# Patient Record
(demographics unavailable — no encounter records)

---

## 2025-04-01 NOTE — PHYSICAL EXAM
[Chaperone Present] : A chaperone was present in the examining room during all aspects of the physical examination [Appropriately responsive] : appropriately responsive [Alert] : alert [No Acute Distress] : no acute distress [No Lymphadenopathy] : no lymphadenopathy [Regular Rate Rhythm] : regular rate rhythm [No Murmurs] : no murmurs [Clear to Auscultation B/L] : clear to auscultation bilaterally [Soft] : soft [Non-tender] : non-tender [Non-distended] : non-distended [No HSM] : No HSM [No Lesions] : no lesions [No Mass] : no mass [Oriented x3] : oriented x3 [Examination Of The Breasts] : a normal appearance [No Masses] : no breast masses were palpable [Discharge] : discharge [Scant] : scant [Clear] : clear [Thin] : thin [Normal] : normal [Normal Position] : in a normal position [FreeTextEntry2] : cassandra henderson [Foul Smelling] : not foul smelling

## 2025-04-01 NOTE — HISTORY OF PRESENT ILLNESS
[Frequency: Q ___ days] : menstrual periods occur approximately every [unfilled] days [Menarche Age: ____] : age at menarche was [unfilled] [Currently Active] : currently active [Men] : men [Vaginal] : vaginal [No] : No [FreeTextEntry1] : 03/28/2025

## 2025-04-01 NOTE — REVIEW OF SYSTEMS
EMG/NCS done. See Procedure Note for results. Discussed EMGNCS of the R UE and R LE showing generalized predominantly sensory polyneuropathy    Just recently had blood tests. Results pending. 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  Patient declined for now  2) Advise optimizing medical management of diabetes  3) Follow up blood test results    FU in 1 month      Lester Dobbins MD [Negative] : Heme/Lymph

## 2025-04-16 NOTE — PROCEDURE
[Endometrial Biopsy] : Endometrial biopsy [Time out performed] : Pre-procedure time out performed.  Patient's name, date of birth and procedure confirmed. [Consent Obtained] : Consent obtained [Thickened Endometrium] : thickened endometrium [Risks] : risks [Benefits] : benefits [Alternatives] : alternatives [Patient] : patient [Infection] : infection [Bleeding] : bleeding [Allergic Reaction] : allergic reaction [Uterine Perforation] : uterine perforation [Pain] : pain [No Premedication] : No premedication [None] : none [Tenaculum] : Tenaculum [Scant] : scant [Mid Position] : mid position [Specimen Collected] : collected [Sent to Pathology] : placed in buffered formalin and sent for pathology [Tolerated Well] : Patient tolerated the procedure well [No Complications] : No complications [LMPDate] : 04/15/2025 [de-identified] : we could not pass theough internal os

## 2025-04-16 NOTE — PHYSICAL EXAM
[MA] : MA [Appropriately responsive] : appropriately responsive [Alert] : alert [No Acute Distress] : no acute distress [No Lymphadenopathy] : no lymphadenopathy [Regular Rate Rhythm] : regular rate rhythm [No Murmurs] : no murmurs [Clear to Auscultation B/L] : clear to auscultation bilaterally [Soft] : soft [Non-tender] : non-tender [Non-distended] : non-distended [No HSM] : No HSM [No Lesions] : no lesions [No Mass] : no mass [Oriented x3] : oriented x3 [Examination Of The Breasts] : a normal appearance [No Masses] : no breast masses were palpable [Discharge] : discharge [Scant] : scant [Clear] : clear [Thin] : thin [Normal] : normal [Normal Position] : in a normal position [FreeTextEntry2] : cassandra henderson [Foul Smelling] : not foul smelling

## 2025-04-16 NOTE — HISTORY OF PRESENT ILLNESS
[Frequency: Q ___ days] : menstrual periods occur approximately every [unfilled] days [Menarche Age: ____] : age at menarche was [unfilled] [Currently Active] : currently active [Men] : men [Vaginal] : vaginal [No] : No [FreeTextEntry1] : 04/15/2025

## 2025-05-02 NOTE — HISTORY OF PRESENT ILLNESS
[FreeTextEntry1] : Ms. Jensen is a 48 yo LMP 4/14/25 with AUB x4 months. Referred by gyn Dr. Corrigan.   She reports irregular bleeding wih menses q2 weeks for the last two months. Denies any heavy bleeding or blood clots. No abdominal or pelvic pain. Denies F/C, N/V, constipation, diarrhea, or weight loss. TVUS shows 4cm hyperechoic mass in uterus.  PMH: none PSH: none Ob: G0 Gyn: -cysts. Hx of fibroids. Last pap NILM/HPV neg 4/1/25 Her  has colon cancer 15 years.  FamHx: denies  Imaging:  MRI Pelvis 4/24/25: FINDINGS: UTERUS: Multiseptated cystic lesion in the cervix measuring up to 2.7 x 2.3 cm (5/12). Differentials include multiple nabothian cysts versus adenoma malignum. Multiple intramural and subserosal fibroids. A left intramural T2 isointense signal lesion measuring 3.5 x 2.5 cm with enhancement, corresponding to previously noted echogenic myometrial lesion in prior sonogram. No discrete diffusion restriction or dark ADC signal. Rest of the smaller uterine fibroid are T2 hypointense with heterogeneous enhancement. ENDOMETRIUM: Question endometrial polypoid lesion measuring 1.0 cm (16/25). JUNCTIONAL ZONE: No abnormal thickening.  RIGHT OVARY: 3.1 x 2.1 x 1.5 cm. Within normal limits. LEFT OVARY: 3.0 x 1.5 x 1.4 cm. Within normal limits. ADNEXA: Within normal limits.  BLADDER: Within normal limits.  LYMPH NODES: No pelvic lymphadenopathy.  VISUALIZED PORTIONS:  ABDOMINAL ORGANS: Within normal limits. BOWEL: Within normal limits. PERITONEUM: No ascites. VESSELS: Within normal limits. ABDOMINAL WALL: Within normal limits. BONES: Within normal limits.  IMPRESSION: 1.  Multiseptated cystic lesion in the cervix. Differentials include multiple nabothian cysts versus adenoma malignum. 2.  Multiple intramural and subserosal fibroids. Previously noted left myometrial echogenic lesion demonstrates T2 intermedius signal with smooth margin and enhancement. However, no diffusion restriction or low ADC signal noted. Findings are suggestive of degenerative fibroid. 3.  Question endometrial polyp measuring 1.0 cm.  Findings were notified to Dr. DIO CORRIGAN 4/28/2025 1:38 PM by Dr. Hope via secure email.   TVUS 4/10/25: FINDINGS: Uterus: 9.8 cm x 5.7 cm x 7.5 cm. Hyperechoic hypervascular mass along the left lateral uterus measures 4.0 x 3.4 x 3.3 cm. * Right-sided intramural fibroid measures 2.1 x 2.2 x 1.8 cm. * Anterior intramural fibroid measures 1.3 x 1.4 x 1.4 cm. Endometrium: 18 mm. Within normal limits. Right ovary: 5.3 cm x 3.7 cm x 4.1 cm. Within normal limits. Simple cyst measures 4.0 x 3.3 x 3.3 cm. Left ovary: 3.3 x 1.9 x 2.6 cm. Within normal limits. Fluid: None.  IMPRESSION: Hyperechoic mass in the uterus measuring to 4.0 cm. Malignancy is a consideration. Further evaluation with MRI with and without contrast is recommended.  Health maintenance: Pap: 4/1/25: NILM, HPV neg Mammo: 12/2024 BIRADS 0, s/p biopsy (path pending, likely benign per pt) Colonoscopy: 2 years ago, normal

## 2025-05-02 NOTE — PHYSICAL EXAM
[Normal] : Recto-Vaginal Exam: Normal [Fully active, able to carry on all pre-disease performance without restriction] : Status 0 - Fully active, able to carry on all pre-disease performance without restriction [FreeTextEntry2] : Cami [de-identified] : Multiple nabothian cysts. Stenosis of internal cervical os.  [de-identified] : Smooth RV septum. No masses or nodularity in cul-de-sac. No prolapse.  [de-identified] : Endometrial biopsy unable to be performed due to stenosis of internal os, may be mass effect from cysts

## 2025-05-02 NOTE — PHYSICAL EXAM
[Normal] : Recto-Vaginal Exam: Normal [Fully active, able to carry on all pre-disease performance without restriction] : Status 0 - Fully active, able to carry on all pre-disease performance without restriction [FreeTextEntry2] : Cami [de-identified] : Multiple nabothian cysts. Stenosis of internal cervical os.  [de-identified] : Smooth RV septum. No masses or nodularity in cul-de-sac. No prolapse.  [de-identified] : Endometrial biopsy unable to be performed due to stenosis of internal os, may be mass effect from cysts

## 2025-05-02 NOTE — DISCUSSION/SUMMARY
[Reviewed Clinical Lab Test(s)] : Results of clinical tests were reviewed. [Reviewed Radiology Report(s)] : Radiology reports were reviewed. [FreeTextEntry1] : 50 yo with hx of AUB referred for further management  - EMB unsuccessful, may be 2/2 mass effect from multiple ?nabothian cysts vs stenosis - MRI pelvis notable for nabothian cysts vs adenoma malignum however last pap normal. Cervical bx and ECC obtained today. Will f/u path and call pt with results - RTC pending results of bx

## 2025-05-02 NOTE — PHYSICAL EXAM
[Normal] : Recto-Vaginal Exam: Normal [Fully active, able to carry on all pre-disease performance without restriction] : Status 0 - Fully active, able to carry on all pre-disease performance without restriction [FreeTextEntry2] : Cami [de-identified] : Multiple nabothian cysts. Stenosis of internal cervical os.  [de-identified] : Smooth RV septum. No masses or nodularity in cul-de-sac. No prolapse.  [de-identified] : Endometrial biopsy unable to be performed due to stenosis of internal os, may be mass effect from cysts

## 2025-05-02 NOTE — HISTORY OF PRESENT ILLNESS
[FreeTextEntry1] : Ms. Jensen is a 50 yo LMP 4/14/25 with AUB x4 months. Referred by gyn Dr. Corrigan.   She reports irregular bleeding wih menses q2 weeks for the last two months. Denies any heavy bleeding or blood clots. No abdominal or pelvic pain. Denies F/C, N/V, constipation, diarrhea, or weight loss. TVUS shows 4cm hyperechoic mass in uterus.  PMH: none PSH: none Ob: G0 Gyn: -cysts. Hx of fibroids. Last pap NILM/HPV neg 4/1/25 Her  has colon cancer 15 years.  FamHx: denies  Imaging:  MRI Pelvis 4/24/25: FINDINGS: UTERUS: Multiseptated cystic lesion in the cervix measuring up to 2.7 x 2.3 cm (5/12). Differentials include multiple nabothian cysts versus adenoma malignum. Multiple intramural and subserosal fibroids. A left intramural T2 isointense signal lesion measuring 3.5 x 2.5 cm with enhancement, corresponding to previously noted echogenic myometrial lesion in prior sonogram. No discrete diffusion restriction or dark ADC signal. Rest of the smaller uterine fibroid are T2 hypointense with heterogeneous enhancement. ENDOMETRIUM: Question endometrial polypoid lesion measuring 1.0 cm (16/25). JUNCTIONAL ZONE: No abnormal thickening.  RIGHT OVARY: 3.1 x 2.1 x 1.5 cm. Within normal limits. LEFT OVARY: 3.0 x 1.5 x 1.4 cm. Within normal limits. ADNEXA: Within normal limits.  BLADDER: Within normal limits.  LYMPH NODES: No pelvic lymphadenopathy.  VISUALIZED PORTIONS:  ABDOMINAL ORGANS: Within normal limits. BOWEL: Within normal limits. PERITONEUM: No ascites. VESSELS: Within normal limits. ABDOMINAL WALL: Within normal limits. BONES: Within normal limits.  IMPRESSION: 1.  Multiseptated cystic lesion in the cervix. Differentials include multiple nabothian cysts versus adenoma malignum. 2.  Multiple intramural and subserosal fibroids. Previously noted left myometrial echogenic lesion demonstrates T2 intermedius signal with smooth margin and enhancement. However, no diffusion restriction or low ADC signal noted. Findings are suggestive of degenerative fibroid. 3.  Question endometrial polyp measuring 1.0 cm.  Findings were notified to Dr. DIO CORRIGAN 4/28/2025 1:38 PM by Dr. Hope via secure email.   TVUS 4/10/25: FINDINGS: Uterus: 9.8 cm x 5.7 cm x 7.5 cm. Hyperechoic hypervascular mass along the left lateral uterus measures 4.0 x 3.4 x 3.3 cm. * Right-sided intramural fibroid measures 2.1 x 2.2 x 1.8 cm. * Anterior intramural fibroid measures 1.3 x 1.4 x 1.4 cm. Endometrium: 18 mm. Within normal limits. Right ovary: 5.3 cm x 3.7 cm x 4.1 cm. Within normal limits. Simple cyst measures 4.0 x 3.3 x 3.3 cm. Left ovary: 3.3 x 1.9 x 2.6 cm. Within normal limits. Fluid: None.  IMPRESSION: Hyperechoic mass in the uterus measuring to 4.0 cm. Malignancy is a consideration. Further evaluation with MRI with and without contrast is recommended.  Health maintenance: Pap: 4/1/25: NILM, HPV neg Mammo: 12/2024 BIRADS 0, s/p biopsy (path pending, likely benign per pt) Colonoscopy: 2 years ago, normal

## 2025-05-02 NOTE — PROCEDURE
[Endometrial Biopsy] : an endometrial biopsy [Other: ___] : [unfilled] [Patient] : the patient [Written consent] : written consent was obtained prior to the procedure and is detailed in the patient's record [No Complications] : none [Tolerated Well] : the patient tolerated the procedure well [Other ___] : [unfilled] [Cervical Biopsy] : a cervical biopsy [Betadine] : betadine [Hailey's] : Hailey's solution [Post procedure instructions and information given] : post procedure instructions and information given and reviewed with patient. [FreeTextEntry1] : Endometrial biopsy attempted, however unsuccessful. Os finder used however unable to enter. May be 2/2 mass effect from cysts vs stenosis.  Cervical biopsies obtained at 3, 6, 9, and 12 o'clock as well as ECC.

## 2025-06-23 NOTE — DISCUSSION/SUMMARY
[Vaginal Exam Abnormal] : abnormal vaginal exam [Doing Well] : is doing well [Excellent Pain Control] : has excellent pain control [No Sign of Infection] : is showing no signs of infection [de-identified] : No s/s infection.  stitch in place on cervix; well healing cone biopsy site

## 2025-06-23 NOTE — LETTER BODY
[Dear  ___] : Dear  [unfilled], [I recently saw our patient [unfilled] for a follow-up visit.] : I recently saw our patient, [unfilled] for a follow-up visit. [Attached please find my note.] : Attached please find my note. [FreeTextEntry2] :   Pt is s/p cone biopsy and EMC on 6/13/25.  [FreeTextEntry1] : final path

## 2025-06-23 NOTE — ASSESSMENT
[FreeTextEntry1] :  -  cone biopsy site healing well.  No s/s of infection. -  Reviewed pathology with patient and provided a copy. -  Reiterated post op education including:    - no sexual intercourse, tub bath, swimming, hot tub for the next 2-3 weeks. -  Return in 1 year for follow up pap. -  Patient verbalized understanding of plan and agrees with same. -  All questions answered.  -  Pt seen and plan discussed with Dr. Alberts during this visit.

## 2025-06-23 NOTE — REASON FOR VISIT
[Post Op] : post op visit [de-identified] : 6/13/25 [de-identified] : cone biopsy; emc [de-identified] :  Denies f/c/n/v/d/vaginal bleeding.  Overall feels well.  Meeting milestones of recovery.  Regular BM and voiding freely.  Final path: 1  Cervical cone biopsy stitch at 12 o'clock - Benign cervical mucosa from the transformation zone with chronic cervicitis  2  Endocervix curettings - Fragments of benign endocervical glands - Scant benign squamous epithelium  3  Endometrial curettings - Fragments of endometrial polyp - Proliferative endometrium - Benign endocervical tissue